# Patient Record
Sex: MALE | Race: OTHER | NOT HISPANIC OR LATINO | URBAN - METROPOLITAN AREA
[De-identification: names, ages, dates, MRNs, and addresses within clinical notes are randomized per-mention and may not be internally consistent; named-entity substitution may affect disease eponyms.]

---

## 2017-08-14 ENCOUNTER — EMERGENCY (EMERGENCY)
Facility: HOSPITAL | Age: 27
LOS: 1 days | Discharge: HOME | End: 2017-08-14
Admitting: EMERGENCY MEDICINE
Payer: SELF-PAY

## 2017-08-14 VITALS
HEART RATE: 100 BPM | SYSTOLIC BLOOD PRESSURE: 116 MMHG | WEIGHT: 179.9 LBS | DIASTOLIC BLOOD PRESSURE: 70 MMHG | TEMPERATURE: 99 F | RESPIRATION RATE: 16 BRPM
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
## 2017-08-14 DIAGNOSIS — S01.81XA LACERATION WITHOUT FOREIGN BODY OF OTHER PART OF HEAD, INITIAL ENCOUNTER: ICD-10-CM

## 2017-08-14 DIAGNOSIS — Z23 ENCOUNTER FOR IMMUNIZATION: ICD-10-CM

## 2017-08-14 DIAGNOSIS — Z88.0 ALLERGY STATUS TO PENICILLIN: ICD-10-CM
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
## 2017-08-14 PROCEDURE — 12011 RPR F/E/E/N/L/M 2.5 CM/<: CPT

## 2017-08-14 PROCEDURE — 99053 MED SERV 10PM-8AM 24 HR FAC: CPT

## 2017-08-14 PROCEDURE — 99283 EMERGENCY DEPT VISIT LOW MDM: CPT | Mod: 25

## 2017-08-14 NOTE — ED ADULT TRIAGE NOTE - CHIEF COMPLAINT QUOTE
" I hit my head against door accidently around 9pm"   denies LOC, no  H/A, dizziness, no vison changes, not on a/c, denies PMH

## 2017-08-14 NOTE — ED ADULT NURSE NOTE - OBJECTIVE STATEMENT
26 y/o male with a sustained laceration to the left forehead after hitting his head with a door. Pt denies LOC, h/a, n/v,  0.5 cm laceration noted to the forehead, no bleeding at present time. Pending initial evaluation. Will continue to monitor

## 2017-08-15 PROCEDURE — 90471 IMMUNIZATION ADMIN: CPT

## 2017-08-15 PROCEDURE — 90715 TDAP VACCINE 7 YRS/> IM: CPT

## 2017-08-15 PROCEDURE — 99283 EMERGENCY DEPT VISIT LOW MDM: CPT | Mod: 25

## 2017-08-15 PROCEDURE — 12011 RPR F/E/E/N/L/M 2.5 CM/<: CPT

## 2017-08-15 RX ORDER — TETANUS TOXOID, REDUCED DIPHTHERIA TOXOID AND ACELLULAR PERTUSSIS VACCINE, ADSORBED 5; 2.5; 8; 8; 2.5 [IU]/.5ML; [IU]/.5ML; UG/.5ML; UG/.5ML; UG/.5ML
0.5 SUSPENSION INTRAMUSCULAR ONCE
Qty: 0 | Refills: 0 | Status: COMPLETED | OUTPATIENT
Start: 2017-08-15 | End: 2017-08-15

## 2017-08-15 RX ADMIN — TETANUS TOXOID, REDUCED DIPHTHERIA TOXOID AND ACELLULAR PERTUSSIS VACCINE, ADSORBED 0.5 MILLILITER(S): 5; 2.5; 8; 8; 2.5 SUSPENSION INTRAMUSCULAR at 00:54

## 2017-08-15 NOTE — ED PROVIDER NOTE - MEDICAL DECISION MAKING DETAILS
28 y/o m laceration to left forehead; tetanus updated, lac repaired in ED, d/c with wound care instructions, f/u 5 days for suture removal

## 2017-08-15 NOTE — ED PROVIDER NOTE - OBJECTIVE STATEMENT
28 y/o m c/o laceration to left forehead above eyebrow today after he walked into a glass door accidentally and his glasses were pushed into his face.  Pt stating he  remembers everything, bleeding controlled with pressure.  Denies LOC, visual changes, all other ROS negative.  Last tetanus unknown.

## 2017-08-20 ENCOUNTER — EMERGENCY (EMERGENCY)
Facility: HOSPITAL | Age: 27
LOS: 1 days | Discharge: HOME | End: 2017-08-20
Attending: EMERGENCY MEDICINE | Admitting: EMERGENCY MEDICINE
Payer: SELF-PAY

## 2017-08-20 VITALS
TEMPERATURE: 98 F | HEART RATE: 87 BPM | DIASTOLIC BLOOD PRESSURE: 68 MMHG | SYSTOLIC BLOOD PRESSURE: 104 MMHG | OXYGEN SATURATION: 98 % | RESPIRATION RATE: 18 BRPM
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
## 2017-08-20 DIAGNOSIS — S01.81XD LACERATION WITHOUT FOREIGN BODY OF OTHER PART OF HEAD, SUBSEQUENT ENCOUNTER: ICD-10-CM
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
## 2017-08-20 PROCEDURE — 99212 OFFICE O/P EST SF 10 MIN: CPT

## 2017-08-20 NOTE — ED ADULT NURSE NOTE - OBJECTIVE STATEMENT
Pt presents to ED for wound check. Pt ran into glass door one week ago, sutures in place to forehead above L eyebrow, here for removal. Pt denies pain. Wound well approximated, no drainage no pus. Pt presents in NAD speaking full sentences ambulatory through triage.

## 2017-08-20 NOTE — ED ADULT TRIAGE NOTE - ARRIVAL INFO ADDITIONAL COMMENTS
suture removal. placed monday. no observable abnormalities noted to site, sutures intact. denies any fever, chills. suture removal near left eyebrow . placed monday. no observable abnormalities noted to site, sutures intact. denies any fever, chills.

## 2022-01-18 NOTE — ED ADULT NURSE NOTE - NURSING SKIN WOUND LOCATION #1
Addended by: GUZMAN SCHULTZ on: 1/18/2022 01:09 PM     Modules accepted: Level of Service    
forehead

## 2022-12-14 NOTE — ED ADULT NURSE NOTE - CCCP TRG CHIEF CMPLNT
Normal growth and development discussed with parent. Discussed need to est with local dentist. Discussed potty training tips. Parent shown growth chart. Immunizations reviewed, updated today.  Age-appropriate anticipatory guidance handout given. Encouraged healthy diet, exposure of different food items, limits juice/sugary drinks. Brush teeth daily. Limit screen time to 2 hours/day max. Discussed water safety. Continue to read to child to develop vocabulary. Return to clinic 1 year for 5 year well child check. Parent understand and agrees with plan.    
suture/staple removal

## 2023-06-19 NOTE — ED ADULT NURSE NOTE - DOES PATIENT HAVE ADVANCE DIRECTIVE
Spoke with patient and informed them that Dr. Malloy is not in clinic today and that message would be sent to her for review.    Patient verbalized understanding and had no further questions at time of call.    No